# Patient Record
Sex: FEMALE | Race: WHITE | URBAN - METROPOLITAN AREA
[De-identification: names, ages, dates, MRNs, and addresses within clinical notes are randomized per-mention and may not be internally consistent; named-entity substitution may affect disease eponyms.]

---

## 2018-03-23 ENCOUNTER — OUTPATIENT (OUTPATIENT)
Dept: OUTPATIENT SERVICES | Facility: HOSPITAL | Age: 48
LOS: 1 days | Discharge: HOME | End: 2018-03-23
Payer: MEDICAID

## 2018-03-23 DIAGNOSIS — L26 EXFOLIATIVE DERMATITIS: ICD-10-CM

## 2018-04-01 ENCOUNTER — OUTPATIENT (OUTPATIENT)
Dept: OUTPATIENT SERVICES | Facility: HOSPITAL | Age: 48
LOS: 1 days | End: 2018-04-01
Payer: MEDICAID

## 2018-04-12 DIAGNOSIS — R69 ILLNESS, UNSPECIFIED: ICD-10-CM

## 2019-03-09 PROBLEM — Z00.00 ENCOUNTER FOR PREVENTIVE HEALTH EXAMINATION: Status: ACTIVE | Noted: 2019-03-09

## 2019-04-08 ENCOUNTER — APPOINTMENT (OUTPATIENT)
Dept: VASCULAR SURGERY | Facility: CLINIC | Age: 49
End: 2019-04-08
Payer: MEDICARE

## 2019-04-08 ENCOUNTER — APPOINTMENT (OUTPATIENT)
Dept: VASCULAR SURGERY | Facility: CLINIC | Age: 49
End: 2019-04-08
Payer: MEDICAID

## 2019-04-08 VITALS
DIASTOLIC BLOOD PRESSURE: 65 MMHG | WEIGHT: 185 LBS | HEART RATE: 83 BPM | BODY MASS INDEX: 29.03 KG/M2 | SYSTOLIC BLOOD PRESSURE: 94 MMHG | HEIGHT: 67 IN | TEMPERATURE: 98.1 F

## 2019-10-21 ENCOUNTER — APPOINTMENT (OUTPATIENT)
Dept: VASCULAR SURGERY | Facility: CLINIC | Age: 49
End: 2019-10-21

## 2022-12-05 ENCOUNTER — APPOINTMENT (OUTPATIENT)
Dept: RHEUMATOLOGY | Facility: CLINIC | Age: 52
End: 2022-12-05

## 2022-12-05 VITALS
RESPIRATION RATE: 16 BRPM | DIASTOLIC BLOOD PRESSURE: 72 MMHG | HEART RATE: 80 BPM | SYSTOLIC BLOOD PRESSURE: 112 MMHG | OXYGEN SATURATION: 98 % | WEIGHT: 200 LBS | TEMPERATURE: 97.8 F | BODY MASS INDEX: 31.39 KG/M2 | HEIGHT: 67 IN

## 2022-12-05 DIAGNOSIS — Z86.79 PERSONAL HISTORY OF OTHER DISEASES OF THE CIRCULATORY SYSTEM: ICD-10-CM

## 2022-12-05 DIAGNOSIS — Z78.9 OTHER SPECIFIED HEALTH STATUS: ICD-10-CM

## 2022-12-05 RX ORDER — FOLIC ACID 1 MG/1
1 TABLET ORAL
Refills: 0 | Status: ACTIVE | COMMUNITY
Start: 2022-12-05

## 2022-12-05 RX ORDER — ROSUVASTATIN CALCIUM 10 MG/1
10 TABLET, FILM COATED ORAL
Qty: 90 | Refills: 0 | Status: ACTIVE | COMMUNITY
Start: 2022-01-20

## 2022-12-12 ENCOUNTER — NON-APPOINTMENT (OUTPATIENT)
Age: 52
End: 2022-12-12

## 2022-12-12 LAB
ALBUMIN SERPL ELPH-MCNC: 4.5 G/DL
ALP BLD-CCNC: 79 U/L
ALT SERPL-CCNC: 35 U/L
ANA PAT FLD IF-IMP: ABNORMAL
ANA SER IF-ACNC: ABNORMAL
ANION GAP SERPL CALC-SCNC: 11 MMOL/L
APPEARANCE: CLEAR
APTT BLD: 28.8 SEC
AST SERPL-CCNC: 24 U/L
B2 GLYCOPROT1 AB SER QL: NEGATIVE
BACTERIA: NEGATIVE
BASOPHILS # BLD AUTO: 0.06 K/UL
BASOPHILS NFR BLD AUTO: 0.8 %
BILIRUB SERPL-MCNC: 0.4 MG/DL
BILIRUBIN URINE: NEGATIVE
BLOOD URINE: NEGATIVE
BUN SERPL-MCNC: 17 MG/DL
CALCIUM SERPL-MCNC: 9.6 MG/DL
CARDIOLIPIN AB SER IA-ACNC: NEGATIVE
CENTROMERE IGG SER-ACNC: <0.2 CD:130001892
CHLORIDE SERPL-SCNC: 105 MMOL/L
CO2 SERPL-SCNC: 24 MMOL/L
COLOR: ABNORMAL
CREAT SERPL-MCNC: 0.85 MG/DL
CREAT SPEC-SCNC: 112 MG/DL
CREAT/PROT UR: 0.1 RATIO
CRP SERPL-MCNC: <3 MG/L
DSDNA AB SER-ACNC: <12 IU/ML
EGFR: 82 ML/MIN/1.73M2
ENA RNP AB SER IA-ACNC: <0.2 AL
ENA SCL70 IGG SER IA-ACNC: <0.2 AL
ENA SM AB SER IA-ACNC: <0.2 AL
ENA SS-A AB SER IA-ACNC: <0.2 AL
ENA SS-B AB SER IA-ACNC: <0.2 AL
EOSINOPHIL # BLD AUTO: 0.06 K/UL
EOSINOPHIL NFR BLD AUTO: 0.8 %
ERYTHROCYTE [SEDIMENTATION RATE] IN BLOOD BY WESTERGREN METHOD: 9 MM/HR
GLUCOSE QUALITATIVE U: NEGATIVE
HAV IGM SER QL: NONREACTIVE
HBV CORE IGG+IGM SER QL: REACTIVE
HBV CORE IGM SER QL: NONREACTIVE
HBV SURFACE AG SER QL: REACTIVE
HCT VFR BLD CALC: 39.4 %
HCV AB SER QL: NONREACTIVE
HCV S/CO RATIO: 0.33 S/CO
HGB BLD-MCNC: 12.6 G/DL
HYALINE CASTS: 2 /LPF
IMM GRANULOCYTES NFR BLD AUTO: 0.7 %
KETONES URINE: NEGATIVE
LEUKOCYTE ESTERASE URINE: NEGATIVE
LYMPHOCYTES # BLD AUTO: 3.2 K/UL
LYMPHOCYTES NFR BLD AUTO: 44.4 %
M TB IFN-G BLD-IMP: NEGATIVE
MAN DIFF?: NORMAL
MCHC RBC-ENTMCNC: 29.5 PG
MCHC RBC-ENTMCNC: 32 GM/DL
MCV RBC AUTO: 92.3 FL
MICROSCOPIC-UA: NORMAL
MONOCYTES # BLD AUTO: 0.51 K/UL
MONOCYTES NFR BLD AUTO: 7.1 %
NEUTROPHILS # BLD AUTO: 3.32 K/UL
NEUTROPHILS NFR BLD AUTO: 46.2 %
NITRITE URINE: NEGATIVE
PH URINE: 6
PLATELET # BLD AUTO: 274 K/UL
POTASSIUM SERPL-SCNC: 4.6 MMOL/L
PROT SERPL-MCNC: 7.3 G/DL
PROT UR-MCNC: 15 MG/DL
PROTEIN URINE: NEGATIVE
QUANTIFERON TB PLUS MITOGEN MINUS NIL: >10 IU/ML
QUANTIFERON TB PLUS NIL: 0.02 IU/ML
QUANTIFERON TB PLUS TB1 MINUS NIL: 0 IU/ML
QUANTIFERON TB PLUS TB2 MINUS NIL: 0 IU/ML
RBC # BLD: 4.27 M/UL
RBC # FLD: 16.6 %
RED BLOOD CELLS URINE: 4 /HPF
RNA POLYMERASE III IGG: 6 UNITS
SODIUM SERPL-SCNC: 140 MMOL/L
SPECIFIC GRAVITY URINE: 1.02
SQUAMOUS EPITHELIAL CELLS: 1 /HPF
THYROGLOB AB SERPL-ACNC: <20 IU/ML
THYROPEROXIDASE AB SERPL IA-ACNC: 13.3 IU/ML
TSH SERPL-ACNC: 1.32 UIU/ML
UROBILINOGEN URINE: NORMAL
WBC # FLD AUTO: 7.2 K/UL
WHITE BLOOD CELLS URINE: 0 /HPF

## 2022-12-12 NOTE — ASSESSMENT
[FreeTextEntry1] : Obtain labs as outlined below to evaluate for SLE vs RA vs chronic pain syndrome \par At present there is no evidence of inflammatory myopathy or arthropathy on exam \par US of the hands, wrists, feet and ankles to evaluate for presence of synovitis\par check hepatitis panel and QuantiFERON-TB gold in the setting of immunosuppressive therapy\par patient to f/u after imaging \par \par \par \par

## 2022-12-12 NOTE — REASON FOR VISIT
[Initial Evaluation] : an initial evaluation [FreeTextEntry1] : evaluate for inflammatory arthritis

## 2022-12-12 NOTE — PHYSICAL EXAM
[General Appearance - Alert] : alert [General Appearance - In No Acute Distress] : in no acute distress [Full Pulse] : the pedal pulses are present [Edema] : there was no peripheral edema [Abnormal Walk] : normal gait [Nail Clubbing] : no clubbing  or cyanosis of the fingernails [Musculoskeletal - Swelling] : no joint swelling seen [Motor Tone] : muscle strength and tone were normal [Oriented To Time, Place, And Person] : oriented to person, place, and time [Impaired Insight] : insight and judgment were intact [Affect] : the affect was normal

## 2022-12-12 NOTE — CONSULT LETTER
[Dear  ___] : Dear  [unfilled], [Consult Letter:] : I had the pleasure of evaluating your patient, [unfilled]. [Please see my note below.] : Please see my note below. [Consult Closing:] : Thank you very much for allowing me to participate in the care of this patient.  If you have any questions, please do not hesitate to contact me. [Sincerely,] : Sincerely, [FreeTextEntry1] : R [FreeTextEntry3] : Dr. Priscila Moser \par Rheumatology Attending\par [facility]\par

## 2022-12-12 NOTE — HISTORY OF PRESENT ILLNESS
[FreeTextEntry1] : Patient presents for second opinion. \par \par She was diagnosed with inflammatory arthritis 1/2022.  She presented with diffuse pain and weight gain/swelling. Joint pain was diffuse and she recalls her joints were swollen as well. Her symptoms of "inflammation" date back to 2019, however.  She was told that it may be related to menopause. \par Since diagnosis of RA, she was started on Rasuvo and Humira.  She was doing well initially but then symptoms started recurring. \par She was treated with ssz in 1/2022 as well.  Prior to Humira, she was treated with Rinvoq. \par ROS: weight gain\par diffuse joint pain \par no fevers, chills, chest pain, dyspnea\par stiffness in the joints lasts 2-3 hours in the morning. \par \par Prior labs reviewed: DARRYL 1:80, RNP negative, complements wnl, TFTs wnl, Smith wnl, dsDNA negative\par

## 2022-12-21 ENCOUNTER — RESULT REVIEW (OUTPATIENT)
Age: 52
End: 2022-12-21

## 2022-12-21 ENCOUNTER — APPOINTMENT (OUTPATIENT)
Dept: ULTRASOUND IMAGING | Facility: CLINIC | Age: 52
End: 2022-12-21
Payer: MEDICARE

## 2022-12-21 ENCOUNTER — OUTPATIENT (OUTPATIENT)
Dept: OUTPATIENT SERVICES | Facility: HOSPITAL | Age: 52
LOS: 1 days | End: 2022-12-21
Payer: MEDICARE

## 2022-12-21 ENCOUNTER — APPOINTMENT (OUTPATIENT)
Dept: RADIOLOGY | Facility: CLINIC | Age: 52
End: 2022-12-21
Payer: MEDICARE

## 2022-12-21 DIAGNOSIS — Z13.820 ENCOUNTER FOR SCREENING FOR OSTEOPOROSIS: ICD-10-CM

## 2022-12-21 DIAGNOSIS — M19.90 UNSPECIFIED OSTEOARTHRITIS, UNSPECIFIED SITE: ICD-10-CM

## 2023-01-19 ENCOUNTER — APPOINTMENT (OUTPATIENT)
Dept: ENDOCRINOLOGY | Facility: CLINIC | Age: 53
End: 2023-01-19

## 2023-01-23 ENCOUNTER — NON-APPOINTMENT (OUTPATIENT)
Age: 53
End: 2023-01-23

## 2023-01-25 LAB
CCP AB SER IA-ACNC: <8 UNITS
HBV CORE IGM SER QL: NONREACTIVE
HBV DNA # SERPL NAA+PROBE: 1073 IU/ML
HBV SURFACE AB SER QL: NONREACTIVE
HBV SURFACE AB SERPL IA-ACNC: <3 MIU/ML
HBV SURFACE AG SER QL: REACTIVE
HEPB DNA PCR INT: DETECTED
HEPB DNA PCR LOG: 3.03 LOGIU/ML
RF+CCP IGG SER-IMP: NEGATIVE
RHEUMATOID FACT SER QL: 10 IU/ML

## 2023-01-26 ENCOUNTER — APPOINTMENT (OUTPATIENT)
Dept: RHEUMATOLOGY | Facility: CLINIC | Age: 53
End: 2023-01-26
Payer: MEDICARE

## 2023-01-26 VITALS
OXYGEN SATURATION: 98 % | BODY MASS INDEX: 31.39 KG/M2 | TEMPERATURE: 97.9 F | SYSTOLIC BLOOD PRESSURE: 108 MMHG | WEIGHT: 200 LBS | HEIGHT: 67 IN | DIASTOLIC BLOOD PRESSURE: 75 MMHG | HEART RATE: 113 BPM

## 2023-01-26 DIAGNOSIS — Z87.39 PERSONAL HISTORY OF OTHER DISEASES OF THE MUSCULOSKELETAL SYSTEM AND CONNECTIVE TISSUE: ICD-10-CM

## 2023-01-26 DIAGNOSIS — R53.83 OTHER FATIGUE: ICD-10-CM

## 2023-01-26 DIAGNOSIS — R06.00 DYSPNEA, UNSPECIFIED: ICD-10-CM

## 2023-01-26 DIAGNOSIS — Z13.820 ENCOUNTER FOR SCREENING FOR OSTEOPOROSIS: ICD-10-CM

## 2023-01-26 RX ORDER — METHOTREXATE 20 MG/.4ML
20 INJECTION, SOLUTION SUBCUTANEOUS
Qty: 1 | Refills: 0 | Status: COMPLETED | COMMUNITY
Start: 2022-11-16 | End: 2023-01-26

## 2023-01-26 RX ORDER — UPADACITINIB 15 MG/1
15 TABLET, EXTENDED RELEASE ORAL
Qty: 30 | Refills: 0 | Status: COMPLETED | COMMUNITY
Start: 2022-11-16 | End: 2023-01-26

## 2023-01-26 RX ORDER — SULFASALAZINE 500 MG/1
500 TABLET ORAL
Qty: 120 | Refills: 0 | Status: COMPLETED | COMMUNITY
Start: 2022-10-20 | End: 2023-01-26

## 2023-01-27 PROBLEM — Z13.820 OSTEOPOROSIS SCREENING: Status: RESOLVED | Noted: 2022-12-05 | Resolved: 2023-01-27

## 2023-01-27 PROBLEM — Z87.39 HISTORY OF INFLAMMATORY ARTHRITIS: Status: RESOLVED | Noted: 2022-12-05 | Resolved: 2023-01-27

## 2023-01-27 NOTE — HISTORY OF PRESENT ILLNESS
[FreeTextEntry1] : Prior history \par \par She was diagnosed with inflammatory arthritis 1/2022.  She presented with diffuse pain and weight gain/swelling. Joint pain was diffuse and she recalls her joints were swollen as well. Her symptoms of "inflammation" date back to 2019, however.  She was told that it may be related to menopause. \par Since diagnosis of RA, she was started on Rasuvo and Humira.  She was doing well initially but then symptoms started recurring. \par She was treated with ssz in 1/2022 as well.  Prior to Humira, she was treated with Rinvoq. \par ROS: weight gain\par diffuse joint pain \par no fevers, chills, chest pain, dyspnea\par stiffness in the joints lasts 2-3 hours in the morning. \par \par Prior labs reviewed: DARRYL 1:80, RNP negative, complements wnl, TFTs wnl, Smith wnl, dsDNA negative\par \par Interval history\par ____________ \par continue to complain of diffuse body swelling and weight gain\par feeling fatigue \par seen by chinese medicine practitioner and was told that she also has scalp swelling \par she notes more swelling in the lower extremities, especially after prolonged sitting\par she has diffuse body pain \par started duloxetine and feels the pain is somewhat better controlled \par patient admits to worsening shortness of breath lately \par no palpitations but heart rate is higher than baseline on todays check \par \par

## 2023-01-27 NOTE — ASSESSMENT
[FreeTextEntry1] : There is no evidence of inflammatory arthritis clinically or by US \par serology is negative for SLE/RA/Scleroderma/Sjogren's\par Patient with positive DARRYL (borderline) and positive hepatitis B serology and now with presence of hep B by PCR. \par Suspect DARRYL is positive in the setting of hepatitis B infection \par chronic pain syndrome--increase duloxetine to 40 mg daily. Prescription submitted to the pharmacy\par dyspnea on exertion, palpitation and edema -- discussed with patient's PCP/Cardiologist Dr. Sandoval who agrees to see patient for close follow up. \par OV 2 months, sooner PRN \par \par

## 2023-04-04 ENCOUNTER — APPOINTMENT (OUTPATIENT)
Dept: HEPATOLOGY | Facility: CLINIC | Age: 53
End: 2023-04-04

## 2023-04-11 ENCOUNTER — RX RENEWAL (OUTPATIENT)
Age: 53
End: 2023-04-11

## 2023-05-04 ENCOUNTER — APPOINTMENT (OUTPATIENT)
Dept: RHEUMATOLOGY | Facility: CLINIC | Age: 53
End: 2023-05-04
Payer: MEDICARE

## 2023-05-04 VITALS
HEART RATE: 96 BPM | DIASTOLIC BLOOD PRESSURE: 74 MMHG | OXYGEN SATURATION: 98 % | SYSTOLIC BLOOD PRESSURE: 106 MMHG | HEIGHT: 67 IN | WEIGHT: 205 LBS | RESPIRATION RATE: 16 BRPM | BODY MASS INDEX: 32.18 KG/M2 | TEMPERATURE: 97.1 F

## 2023-05-04 DIAGNOSIS — G89.4 CHRONIC PAIN SYNDROME: ICD-10-CM

## 2023-05-04 DIAGNOSIS — R22.9 LOCALIZED SWELLING, MASS AND LUMP, UNSPECIFIED: ICD-10-CM

## 2023-05-30 PROBLEM — R22.9 SOFT TISSUE SWELLING: Status: ACTIVE | Noted: 2023-01-27

## 2023-05-30 NOTE — HISTORY OF PRESENT ILLNESS
[FreeTextEntry1] : Prior history \par \par She was diagnosed with inflammatory arthritis 1/2022.  She presented with diffuse pain and weight gain/swelling. Joint pain was diffuse and she recalls her joints were swollen as well. Her symptoms of "inflammation" date back to 2019, however.  She was told that it may be related to menopause. \par Since diagnosis of RA, she was started on Rasuvo and Humira.  She was doing well initially but then symptoms started recurring. \par She was treated with ssz in 1/2022 as well.  Prior to Humira, she was treated with Rinvoq. \par ROS: weight gain\par diffuse joint pain \par no fevers, chills, chest pain, dyspnea\par stiffness in the joints lasts 2-3 hours in the morning. \par \par Prior labs reviewed: DARRYL 1:80, RNP negative, complements wnl, TFTs wnl, Smith wnl, dsDNA negative\par \par Interval history\par ____________ \par continues to complain of diffuse body swelling and weight gain\par feeling fatigued\par pain is somewhat better with duloxetine \par yet to see hepatology for abnormal hepatitis serology \par \par Previously was seen by chinese medicine practitioner and was told that she also has scalp swelling \par

## 2023-05-30 NOTE — ASSESSMENT
[FreeTextEntry1] : Etiology of LE swelling remain unclear \par consider re-consult with vascular \par continue with duloxetine as seems to control the pain somewhat, declines dose increase \par check labs as outlined below to monitor for toxicity \par hepatology evaluation pending\par OV 3 months, sooner PRN

## 2023-05-30 NOTE — PHYSICAL EXAM
[General Appearance - Alert] : alert [General Appearance - In No Acute Distress] : in no acute distress [FreeTextEntry1] : no synovitis  [Oriented To Time, Place, And Person] : oriented to person, place, and time [Impaired Insight] : insight and judgment were intact [Affect] : the affect was normal

## 2023-06-06 ENCOUNTER — RX RENEWAL (OUTPATIENT)
Age: 53
End: 2023-06-06

## 2023-06-06 LAB
ALBUMIN SERPL ELPH-MCNC: 4.1 G/DL
ALP BLD-CCNC: 104 U/L
ALT SERPL-CCNC: 45 U/L
ANION GAP SERPL CALC-SCNC: 11 MMOL/L
AST SERPL-CCNC: 31 U/L
BASOPHILS # BLD AUTO: 0.06 K/UL
BASOPHILS NFR BLD AUTO: 0.8 %
BILIRUB SERPL-MCNC: 0.4 MG/DL
BUN SERPL-MCNC: 10 MG/DL
CALCIUM SERPL-MCNC: 9.7 MG/DL
CHLORIDE SERPL-SCNC: 108 MMOL/L
CO2 SERPL-SCNC: 25 MMOL/L
CREAT SERPL-MCNC: 0.84 MG/DL
EGFR: 83 ML/MIN/1.73M2
EOSINOPHIL # BLD AUTO: 0.23 K/UL
EOSINOPHIL NFR BLD AUTO: 3.2 %
HCT VFR BLD CALC: 36.5 %
HGB BLD-MCNC: 11.2 G/DL
IMM GRANULOCYTES NFR BLD AUTO: 0.3 %
LYMPHOCYTES # BLD AUTO: 2.8 K/UL
LYMPHOCYTES NFR BLD AUTO: 39 %
MAN DIFF?: NORMAL
MCHC RBC-ENTMCNC: 25.3 PG
MCHC RBC-ENTMCNC: 30.7 GM/DL
MCV RBC AUTO: 82.4 FL
MONOCYTES # BLD AUTO: 0.47 K/UL
MONOCYTES NFR BLD AUTO: 6.5 %
NEUTROPHILS # BLD AUTO: 3.6 K/UL
NEUTROPHILS NFR BLD AUTO: 50.2 %
PLATELET # BLD AUTO: 257 K/UL
POTASSIUM SERPL-SCNC: 4.9 MMOL/L
PROT SERPL-MCNC: 7.3 G/DL
RBC # BLD: 4.43 M/UL
RBC # FLD: 15.3 %
SODIUM SERPL-SCNC: 144 MMOL/L
WBC # FLD AUTO: 7.18 K/UL

## 2023-06-12 ENCOUNTER — APPOINTMENT (OUTPATIENT)
Dept: HEPATOLOGY | Facility: CLINIC | Age: 53
End: 2023-06-12
Payer: MEDICARE

## 2023-06-12 VITALS — BODY MASS INDEX: 31.83 KG/M2 | HEIGHT: 68 IN | WEIGHT: 210 LBS

## 2023-06-12 VITALS
HEART RATE: 68 BPM | HEIGHT: 67 IN | WEIGHT: 210 LBS | DIASTOLIC BLOOD PRESSURE: 69 MMHG | OXYGEN SATURATION: 98 % | SYSTOLIC BLOOD PRESSURE: 95 MMHG | BODY MASS INDEX: 32.96 KG/M2 | RESPIRATION RATE: 14 BRPM | TEMPERATURE: 97 F

## 2023-06-12 DIAGNOSIS — Z78.9 OTHER SPECIFIED HEALTH STATUS: ICD-10-CM

## 2023-06-12 DIAGNOSIS — G89.29 RIGHT UPPER QUADRANT PAIN: ICD-10-CM

## 2023-06-12 DIAGNOSIS — K21.9 GASTRO-ESOPHAGEAL REFLUX DISEASE W/OUT ESOPHAGITIS: ICD-10-CM

## 2023-06-12 DIAGNOSIS — R10.11 RIGHT UPPER QUADRANT PAIN: ICD-10-CM

## 2023-06-12 DIAGNOSIS — M06.9 RHEUMATOID ARTHRITIS, UNSPECIFIED: ICD-10-CM

## 2023-06-12 RX ORDER — METHOTREXATE 25 MG/ML
50 INJECTION INTRA-ARTERIAL; INTRAMUSCULAR; INTRATHECAL; INTRAVENOUS
Refills: 0 | Status: ACTIVE | COMMUNITY

## 2023-06-12 RX ORDER — FAMOTIDINE 20 MG/1
20 TABLET, FILM COATED ORAL
Refills: 0 | Status: ACTIVE | COMMUNITY

## 2023-06-13 LAB
AFP-TM SERPL-MCNC: <1.8 NG/ML
ALBUMIN SERPL ELPH-MCNC: 4.2 G/DL
ALP BLD-CCNC: 104 U/L
ALT SERPL-CCNC: 23 U/L
ANION GAP SERPL CALC-SCNC: 12 MMOL/L
AST SERPL-CCNC: 18 U/L
BILIRUB SERPL-MCNC: 0.4 MG/DL
BUN SERPL-MCNC: 13 MG/DL
CALCIUM SERPL-MCNC: 9.5 MG/DL
CHLORIDE SERPL-SCNC: 106 MMOL/L
CO2 SERPL-SCNC: 23 MMOL/L
CREAT SERPL-MCNC: 0.88 MG/DL
EGFR: 79 ML/MIN/1.73M2
GLUCOSE SERPL-MCNC: 94 MG/DL
HBV E AB SER QL: REACTIVE
HBV E AG SER QL: NONREACTIVE
HEPATITIS A IGG ANTIBODY: REACTIVE
INR PPP: 1.05 RATIO
POTASSIUM SERPL-SCNC: 4.3 MMOL/L
PROT SERPL-MCNC: 7.4 G/DL
PT BLD: 12.3 SEC
SODIUM SERPL-SCNC: 141 MMOL/L

## 2023-06-14 LAB
HBV DNA # SERPL NAA+PROBE: <10 IU/ML
HEPB DNA PCR INT: DETECTED
HEPB DNA PCR LOG: <1 LOGIU/ML

## 2023-06-14 NOTE — HISTORY OF PRESENT ILLNESS
[de-identified] : Ms. PRESLEY is a 53 year old woman with rheumatoid arthritis who was referred by her rheumatologist, Dr. Moser, because of chronic hepatitis B.\par Hepatitis B was diagnosed in 1992 in [place], and again in 1993 when she immigrated to the [place]. \par She remembers that a [employer] teacher, who taught about blood types and checked their blood types, took the needle in her mouth before drawing Mrs. Presley's blood. The needle was new, and she does not know why her teacher did that.\par Denies jaundice, abdominal pain, and pruritus.\par Treatment of rheumatoid arthritis included adalimumab until ~2/2023, and most recently methotrexate.\par She has GERD and had two EGDs elsewhere. She does not want a colonoscopy as she felt sick and had throat pain both times after the gastroscopies.\par Occasionally has RUQ abdominal pain, not now.\par \par \par Alcohol history: does not drink\par Weight history: 210 lbs/BMI 32.9. Max weight was 230 lbs in 2022. Gained 60 lbs after 2019 when she got menopause and developed lymphedema\par Remedies/OTC meds:\par \par ROS: \par Constitutional: no fever, fatigue, weight gain/loss. Eyes: no eye pain or discharge. ENT: no nosebleed, earache, change in hearing. CVS: denies chest pain, palpitations, claudication, irregular heartbeat. Resp: denies SOB, wheezing, cough, SOB on exertion. GI: denies abdominal pain, vomiting, diarrhea, heartburn, melena. Genitourinary: denies dysuria, incontinence. Musculoskeletal: denies joint pain, joint stiffness. Integumentary: denies pruritus, skin lesions, rash. Neuro: denies confusion, dizziness, fainting. Psych: denies anxiety, depression, change in personality. Endo: denies muscle weakness. Heme/lymph: denies easy bleeding and bruising.\par \par Workup:\par - colonoscopy: never\par \par Physical exam:\par Gen: A&Ox3, NAD, obese\par HEENT: normal outer ears & nose, PERRL, mucus membranes moist, anicteric, no lymphadenopathy\par CVS: regular rate and rhythm, no murmur\par Pulm: vesicular breath sounds\par Abdomen: normal bowel sounds, soft, epigastric tenderness, no hepatosplenomegaly\par Legs: tender lymph edema, no clubbing\par Musculoskeletal: normal gait\par Skin: no rash\par Neuro: no asterixis, EOMI\par Psych: normal affect\par

## 2023-06-14 NOTE — ASSESSMENT
[FreeTextEntry1] : Ms. PRESLEY is a 53 year old woman with \par \par - rheumatoid arthritis, on methotrexate/Rasuvo. Was on adalimumab until 2/2023.\par \par - chronic hepatitis B\par - LFTs normal, PLT > 200 G/L\par - obesity, BMI 32, dyslipidemia - at risk for DONALD\par \par -  and her two children are vaccinated against hepatitis B\par - intermittent RUQ abdominal pain/discomfort, not currently.\par \par Risk of hepatitis B reactivation is increased until 9 months after cessation of adalimumab. Will observe LFTs and viral load for now.\par \par Plan:\par - return in 1 month after US abdomen, fibroscan, bloowork as below\par - colon cancer screening: FIT test

## 2023-06-20 LAB — HDV AB SER IA-ACNC: NEGATIVE

## 2023-07-07 ENCOUNTER — RX RENEWAL (OUTPATIENT)
Age: 53
End: 2023-07-07

## 2023-08-08 ENCOUNTER — APPOINTMENT (OUTPATIENT)
Dept: HEPATOLOGY | Facility: CLINIC | Age: 53
End: 2023-08-08
Payer: MEDICARE

## 2023-08-08 VITALS
RESPIRATION RATE: 14 BRPM | TEMPERATURE: 97 F | SYSTOLIC BLOOD PRESSURE: 124 MMHG | HEIGHT: 68 IN | BODY MASS INDEX: 31.52 KG/M2 | HEART RATE: 71 BPM | DIASTOLIC BLOOD PRESSURE: 80 MMHG | OXYGEN SATURATION: 99 % | WEIGHT: 208 LBS

## 2023-08-08 DIAGNOSIS — E78.5 HYPERLIPIDEMIA, UNSPECIFIED: ICD-10-CM

## 2023-08-08 DIAGNOSIS — D84.821 IMMUNODEFICIENCY DUE TO DRUGS: ICD-10-CM

## 2023-08-08 DIAGNOSIS — E66.9 OBESITY, UNSPECIFIED: ICD-10-CM

## 2023-08-08 DIAGNOSIS — Z12.11 ENCOUNTER FOR SCREENING FOR MALIGNANT NEOPLASM OF COLON: ICD-10-CM

## 2023-08-08 DIAGNOSIS — Z79.899 IMMUNODEFICIENCY DUE TO DRUGS: ICD-10-CM

## 2023-08-08 NOTE — HISTORY OF PRESENT ILLNESS
[de-identified] : - 8/8/23: returns after bloodwork, done at last visit, had fibroscan today. US abdomen and FIT test not done. - Ms. PRESLEY is a 53 year old woman with rheumatoid arthritis who was referred by her rheumatologist,  Dr. Moser, because of chronic hepatitis B. Hepatitis B was diagnosed in 1992 in [place], and again in 1993 when she immig[place] the [place].  She remembers that a highschool teacher, who taught about blood types and checked their blood types, took the needle in her mouth before drawing Mrs. Presley's blood. The needle was new, and she does not know why her teacher did that. Denies jaundice, abdominal pain, and pruritus. Treatment of rheumatoid arthritis included adalimumab until ~2/2023, and most recently methotrexate. She has GERD and had two EGDs elsewhere. She does not want a colonoscopy as she felt sick and had throat pain both times after the gastroscopies. Occasionally has RUQ abdominal pain, not now.   Alcohol history: does not drink Weight history: 210 lbs/BMI 32.9. Max weight was 230 lbs in 2022. Gained 60 lbs after 2019 when she got menopause and developed lymphedema Remedies/OTC meds:  ROS:  Constitutional: no fever, fatigue, weight gain/loss. Eyes: no eye pain or discharge. ENT: no nosebleed, earache, change in hearing. CVS: denies chest pain, palpitations, claudication, irregular heartbeat. Resp: denies SOB, wheezing, cough, SOB on exertion. GI: denies abdominal pain, vomiting, diarrhea, heartburn, melena. Genitourinary: denies dysuria, incontinence. Musculoskeletal: denies joint pain, joint stiffness. Integumentary: denies pruritus, skin lesions, rash. Neuro: denies confusion, dizziness, fainting. Psych: denies anxiety, depression, change in personality. Endo: denies muscle weakness. Heme/lymph: denies easy bleeding and bruising.  Workup: - 8/8/23 fibroscan: 5.7 kPa, 272 dB/m - F0-1, S2. No/mild fibrosis. Steatosis. - colonoscopy: never  Physical exam: Gen: A&Ox3, NAD, obese HEENT: normal outer ears & nose, PERRL, mucus membranes moist, anicteric, no lymphadenopathy CVS: regular rate and rhythm, no murmur Pulm: vesicular breath sounds Abdomen: normal bowel sounds, soft, epigastric tenderness, no hepatosplenomegaly Legs: tender lymph edema, no clubbing Musculoskeletal: normal gait Skin: no rash Neuro: no asterixis, EOMI Psych: normal affect

## 2023-08-08 NOTE — ASSESSMENT
[FreeTextEntry1] : Ms. PRESLEY is a 53 year old woman with   - rheumatoid arthritis, on methotrexate/Rasuvo. Was on adalimumab until 2/2023.  - chronic hepatitis B. Viral load <10 IU/mL in 6/2023 - LFTs normal, PLT > 200 G/L - obesity, BMI 32, dyslipidemia - at risk for DONALD  -  and her two children are vaccinated against hepatitis B - intermittent RUQ abdominal pain/discomfort, not currently.  Risk of hepatitis B reactivation is increased until 9 months after cessation of adalimumab. Will observe LFTs and viral load for now.  Plan: - return in 3 months after US abdomen, bloowork and FIT test

## 2023-08-11 ENCOUNTER — APPOINTMENT (OUTPATIENT)
Dept: RHEUMATOLOGY | Facility: CLINIC | Age: 53
End: 2023-08-11
Payer: MEDICARE

## 2023-08-11 VITALS
WEIGHT: 207 LBS | OXYGEN SATURATION: 97 % | DIASTOLIC BLOOD PRESSURE: 80 MMHG | HEART RATE: 83 BPM | HEIGHT: 68 IN | BODY MASS INDEX: 31.37 KG/M2 | SYSTOLIC BLOOD PRESSURE: 114 MMHG

## 2023-08-11 DIAGNOSIS — M79.7 FIBROMYALGIA: ICD-10-CM

## 2023-08-11 DIAGNOSIS — I89.0 LYMPHEDEMA, NOT ELSEWHERE CLASSIFIED: ICD-10-CM

## 2023-08-11 DIAGNOSIS — B18.1 CHRONIC VIRAL HEPATITIS B W/OUT DELTA-AGENT: ICD-10-CM

## 2023-08-13 PROBLEM — M79.7 FIBROMYALGIA SYNDROME: Status: ACTIVE | Noted: 2023-08-13

## 2023-08-13 PROBLEM — B18.1 CHRONIC HEPATITIS B: Status: ACTIVE | Noted: 2023-01-27

## 2023-08-13 PROBLEM — I89.0 LYMPHEDEMA: Status: ACTIVE | Noted: 2022-12-05

## 2023-08-13 NOTE — ASSESSMENT
[FreeTextEntry1] : continue with duloxetine at a current dose.  f/u with GI for hepatitis B management  Lymphedema--message sent to [facility] for possible second opinion for lymphedema management. Previously diagnosis of RA--work up negative including negative serology and negative US without evidence of synovitis; at present, there is no indication for immunosuppressive therapy at this time.    OV 3 months, sooner PRN

## 2023-08-13 NOTE — HISTORY OF PRESENT ILLNESS
[FreeTextEntry1] : Prior history   She was diagnosed with inflammatory arthritis 1/2022.  She presented with diffuse pain and weight gain/swelling. Joint pain was diffuse and she recalls her joints were swollen as well. Her symptoms of "inflammation" date back to 2019, however.  She was told that it may be related to menopause.  Since diagnosis of RA, she was started on Rasuvo and Humira.  She was doing well initially but then symptoms started recurring.  She was treated with ssz in 1/2022 as well.  Prior to Humira, she was treated with Rinvoq.  ROS: weight gain diffuse joint pain  no fevers, chills, chest pain, dyspnea stiffness in the joints lasts 2-3 hours in the morning.   Prior labs reviewed: DARRYL 1:80, RNP negative, complements wnl, TFTs wnl, Smith wnl, dsDNA negative  Interval history ____________  Patient is using acupuncture for lymphedema and find it is somewhat helpful to decrease swelling.  patient went to see her prior rheumatologist and had rinvoq resumes.  Patient had blood work done which: revealed elevated inflammatory markers otherwise was wnl.  she is taking duloxetine finds it helpful.  seen by GI.  Note reviewed: chronic hepatitis B--at [facility] for reactivation; US of the abdomen ordered.

## 2023-08-13 NOTE — HISTORY OF PRESENT ILLNESS
[FreeTextEntry1] : Prior history   She was diagnosed with inflammatory arthritis 1/2022.  She presented with diffuse pain and weight gain/swelling. Joint pain was diffuse and she recalls her joints were swollen as well. Her symptoms of "inflammation" date back to 2019, however.  She was told that it may be related to menopause.  Since diagnosis of RA, she was started on Rasuvo and Humira.  She was doing well initially but then symptoms started recurring.  She was treated with ssz in 1/2022 as well.  Prior to Humira, she was treated with Rinvoq.  ROS: weight gain diffuse joint pain  no fevers, chills, chest pain, dyspnea stiffness in the joints lasts 2-3 hours in the morning.   Prior labs reviewed: DARRYL 1:80, RNP negative, complements wnl, TFTs wnl, Smith wnl, dsDNA negative  Interval history ____________  Patient is using acupuncture for lymphedema and find it is somewhat helpful to decrease swelling.  patient went to see her prior rheumatologist and had rinvoq resumes.  Patient had blood work done which: revealed elevated inflammatory markers otherwise was wnl.  she is taking duloxetine finds it helpful.  seen by GI.  Note reviewed: chronic hepatitis B--at [facility] for reactivation; US of the abdomen ordered.

## 2023-08-13 NOTE — PHYSICAL EXAM
[General Appearance - Alert] : alert [General Appearance - In No Acute Distress] : in no acute distress [FreeTextEntry1] : non pitting edema b/l LE

## 2023-11-16 ENCOUNTER — APPOINTMENT (OUTPATIENT)
Dept: RHEUMATOLOGY | Facility: CLINIC | Age: 53
End: 2023-11-16

## 2023-11-20 ENCOUNTER — APPOINTMENT (OUTPATIENT)
Dept: HEPATOLOGY | Facility: CLINIC | Age: 53
End: 2023-11-20

## 2023-11-22 ENCOUNTER — RX RENEWAL (OUTPATIENT)
Age: 53
End: 2023-11-22

## 2024-01-22 ENCOUNTER — RX RENEWAL (OUTPATIENT)
Age: 54
End: 2024-01-22

## 2024-01-22 RX ORDER — DULOXETINE HYDROCHLORIDE 20 MG/1
20 CAPSULE, DELAYED RELEASE PELLETS ORAL
Qty: 60 | Refills: 0 | Status: ACTIVE | COMMUNITY
Start: 2022-10-17 | End: 1900-01-01

## 2024-02-21 ENCOUNTER — RX RENEWAL (OUTPATIENT)
Age: 54
End: 2024-02-21

## 2025-08-07 ENCOUNTER — APPOINTMENT (OUTPATIENT)
Dept: SURGERY | Facility: CLINIC | Age: 55
End: 2025-08-07